# Patient Record
Sex: MALE | Race: ASIAN | NOT HISPANIC OR LATINO | Employment: UNEMPLOYED | URBAN - METROPOLITAN AREA
[De-identification: names, ages, dates, MRNs, and addresses within clinical notes are randomized per-mention and may not be internally consistent; named-entity substitution may affect disease eponyms.]

---

## 2017-07-06 ENCOUNTER — NON-PROVIDER VISIT (OUTPATIENT)
Dept: URGENT CARE | Facility: PHYSICIAN GROUP | Age: 37
End: 2017-07-06

## 2017-07-06 DIAGNOSIS — Z02.1 PRE-EMPLOYMENT DRUG SCREENING: ICD-10-CM

## 2017-07-06 LAB
AMP AMPHETAMINE: NORMAL
COC COCAINE: NORMAL
INT CON NEG: NEGATIVE
INT CON POS: POSITIVE
MET METHAMPHETAMINES: NORMAL
OPI OPIATES: NORMAL
PCP PHENCYCLIDINE: NORMAL
POC DRUG COMMENT 753798-OCCUPATIONAL HEALTH: NEGATIVE
THC: NORMAL

## 2017-07-06 PROCEDURE — 80305 DRUG TEST PRSMV DIR OPT OBS: CPT | Performed by: PHYSICIAN ASSISTANT

## 2017-08-07 ENCOUNTER — NON-PROVIDER VISIT (OUTPATIENT)
Dept: OCCUPATIONAL MEDICINE | Facility: CLINIC | Age: 37
End: 2017-08-07

## 2017-08-07 DIAGNOSIS — Z02.1 DRUG SCREENING, PRE-EMPLOYMENT: ICD-10-CM

## 2017-08-07 PROCEDURE — 8898 PR URINE 11 PANEL - SEND TO LAB: Performed by: PREVENTIVE MEDICINE

## 2017-08-07 NOTE — MR AVS SNAPSHOT
Marco Stephenosn   2017 6:00 PM   Appointment   MRN: [id number]    Department:  [facility]   Dept Phone:  [phone]    Description:  Male : 1980   Provider:  [place] NON [facility]           Allergies as of 2017     Allergen Noted Reactions    Pcn [Penicillins] 2013       Never has taken. Reported due to parents having allergy      Vital Signs     Smoking Status                   Never Smoker            Basic Information     Date Of Birth Sex Race Ethnicity Preferred Language    1980 Male Other Non- English      Problem List              ICD-10-CM Priority Class Noted - Resolved    Schizophrenia, schizo-affective (CMS-HCC) F25.0   2013 - Present    Acute kidney injury (CMS-[facility]) N17.9 High  2013 - Present    Gastroenteritis K52.9 High  2013 - Present    Nausea & vomiting R11.2   2013 - Present    Abdominal pain    2013 - Present    Diarrhea R19.7   2013 - Present    Leukocytosis D72.829   2013 - Present      Health Maintenance        Date Due Completion Dates    IMM DTaP/Tdap/Td Vaccine (1 - Tdap) 9/15/2001 ---    IMM INFLUENZA (1) 2017 ---            Current Immunizations     No immunizations on file.      Below and/or attached are the medications your provider expects you to take. Review all of your home medications and newly ordered medications with your provider and/or pharmacist. Follow medication instructions as directed by your provider and/or pharmacist. Please keep your medication list with you and share with your provider. Update the information when medications are discontinued, doses are changed, or new medications (including over-the-counter products) are added; and carry medication information at all times in the event of emergency situations     Allergies:  PCN - (reactions not documented)               Medications  Valid as of: 2017 - 12:04 PM    Generic Name Brand Name Tablet Size Instructions for use    Promethazine HCl (Syrup) PHENERGAN 6.25 MG/5ML Take 10 mL by mouth 4 times a day as needed for Nausea/Vomiting.        RisperiDONE (Tab) RISPERDAL 1 MG Take 3 mg by mouth every day. Nightly before bed. prescribed by [facility]  Indications: Schizophrenia        .                 Medicines prescribed today were sent to:     None      Medication refill instructions:       If your prescription bottle indicates you have medication refills left, it is not necessary to call your provider’s office. Please contact your pharmacy and they will refill your medication.    If your prescription bottle indicates you do not have any refills left, you may request refills at any time through one of the following ways: The online [employer] system (except Urgent Care), by calling your provider’s office, or by asking your pharmacy to contact your provider’s office with a refill request. Medication refills are processed only during regular business hours and may not be available until the next business day. Your provider may request additional information or to have a follow-up visit with you prior to refilling your medication.   *Please Note: Medication refills are assigned a new Rx number when refilled electronically. Your pharmacy may indicate that no refills were authorized even though a new prescription for the same medication is available at the pharmacy. Please request the medicine by name with the pharmacy before contacting your provider for a refill.           [employer] Access Code: [email or web address]  Expires: 9/7/2017 12:04 PM    Your email address is not on file at [facility]  Email Addresses are required for you to sign up for [employer], please contact [phone] to verify your personal information and to provide your email address prior to attempting to register for [employer].    Marco Stephenson  [address] [place] [address]    [employer]  A secure, online tool to manage your health information     [employer]s [employer]® is a  secure, online tool that connects you to your personalized health information from the privacy of your home -- day or night - making it very easy for you to manage your healthcare. Once the activation process is completed, you can even access your medical information using the [employer] nikolay, which is available for free in the Apple Nikolay store or Google Play store.     To learn more about [employer], visit [email or web address]    There are two levels of access available (as shown below):   My Chart Features  [facility]  Urgent  Care Non-[facility] Primary [facility] Doctor   Email your healthcare team securely and privately 24/7 X X X    Manage appointments: schedule your next appointment; view details of past/upcoming appointments X      Request prescription refills. X      View recent personal medical records, including lab and immunizations X X X X   View health record, including health history, allergies, medications X X X X   Read reports about your outpatient visits, procedures, consult and ER notes X X X X   See your discharge summary, which is a recap of your hospital and/or ER visit that includes your diagnosis, lab results, and care plan X X  X     How to register for [employer]:  Once your e-mail address has been verified, follow the following steps to sign up for [employer].     1. Go to  [email or web address]  2. Click on the Sign Up Now box, which takes you to the New Member Sign Up page. You will need to provide the following information:  a. Enter your [employer] Access Code exactly as it appears at the top of this page. (You will not need to use this code after you’ve completed the sign-up process. If you do not sign up before the expiration date, you must request a new code.)   b. Enter your date of birth.   c. Enter your home email address.   d. Click Submit, and follow the next screen’s instructions.  3. Create a [employer] ID. This will be your [employer] login ID and cannot be  changed, so think of one that is secure and easy to remember.  4. Create a [employer] password. You can change your password at any time.  5. Enter your Password Reset Question and Answer. This can be used at a later time if you forget your password.   6. Enter your e-mail address. This allows you to receive e-mail notifications when new information is available in [employer].  7. Click Sign Up. You can now view your health information.    For assistance activating your [employer] account, call [phone]

## 2019-11-25 ENCOUNTER — APPOINTMENT (OUTPATIENT)
Dept: RADIOLOGY | Facility: MEDICAL CENTER | Age: 39
End: 2019-11-25
Attending: EMERGENCY MEDICINE
Payer: MEDICAID

## 2019-11-25 ENCOUNTER — HOSPITAL ENCOUNTER (EMERGENCY)
Facility: MEDICAL CENTER | Age: 39
End: 2019-11-25
Attending: EMERGENCY MEDICINE
Payer: MEDICAID

## 2019-11-25 VITALS
HEART RATE: 115 BPM | HEIGHT: 69 IN | TEMPERATURE: 99.7 F | WEIGHT: 197.31 LBS | OXYGEN SATURATION: 92 % | SYSTOLIC BLOOD PRESSURE: 120 MMHG | DIASTOLIC BLOOD PRESSURE: 90 MMHG | RESPIRATION RATE: 16 BRPM | BODY MASS INDEX: 29.22 KG/M2

## 2019-11-25 DIAGNOSIS — J10.1 INFLUENZA B: ICD-10-CM

## 2019-11-25 LAB
FLUAV RNA SPEC QL NAA+PROBE: NEGATIVE
FLUBV RNA SPEC QL NAA+PROBE: POSITIVE
S PYO DNA SPEC NAA+PROBE: NOT DETECTED

## 2019-11-25 PROCEDURE — 71046 X-RAY EXAM CHEST 2 VIEWS: CPT

## 2019-11-25 PROCEDURE — 99284 EMERGENCY DEPT VISIT MOD MDM: CPT

## 2019-11-25 PROCEDURE — 87502 INFLUENZA DNA AMP PROBE: CPT

## 2019-11-25 PROCEDURE — 87651 STREP A DNA AMP PROBE: CPT

## 2019-11-25 PROCEDURE — 700102 HCHG RX REV CODE 250 W/ 637 OVERRIDE(OP): Performed by: EMERGENCY MEDICINE

## 2019-11-25 PROCEDURE — A9270 NON-COVERED ITEM OR SERVICE: HCPCS | Performed by: EMERGENCY MEDICINE

## 2019-11-25 RX ORDER — OSELTAMIVIR PHOSPHATE 75 MG/1
75 CAPSULE ORAL 2 TIMES DAILY
Qty: 10 CAP | Refills: 0 | Status: SHIPPED | OUTPATIENT
Start: 2019-11-25 | End: 2019-11-30

## 2019-11-25 RX ORDER — IBUPROFEN 600 MG/1
600 TABLET ORAL ONCE
Status: COMPLETED | OUTPATIENT
Start: 2019-11-25 | End: 2019-11-25

## 2019-11-25 RX ADMIN — IBUPROFEN 600 MG: 600 TABLET ORAL at 13:47

## 2019-11-25 ASSESSMENT — LIFESTYLE VARIABLES
HAVE PEOPLE ANNOYED YOU BY CRITICIZING YOUR DRINKING: NO
TOTAL SCORE: 0
TOTAL SCORE: 0
EVER FELT BAD OR GUILTY ABOUT YOUR DRINKING: NO
HAVE YOU EVER FELT YOU SHOULD CUT DOWN ON YOUR DRINKING: NO
DOES PATIENT WANT TO STOP DRINKING: NO
CONSUMPTION TOTAL: INCOMPLETE
EVER HAD A DRINK FIRST THING IN THE MORNING TO STEADY YOUR NERVES TO GET RID OF A HANGOVER: NO
TOTAL SCORE: 0
DO YOU DRINK ALCOHOL: NO

## 2019-11-25 NOTE — ED PROVIDER NOTES
ED Provider Note    Scribed for Brendan Schultz M.D. by Fernando Gomez. 11/25/2019  1:47 PM    Primary care provider: EWA Manuel  Means of arrival: Walk in  History obtained from: Patient  History limited by: None    CHIEF COMPLAINT  Chief Complaint   Patient presents with   • Sore Throat   • Flu Like Symptoms       HPI  Marco Stephenson is a 39 y.o. male who presents to the Emergency Department complaining of flu-like symptoms onset yesterday. Patient reports having associated fever and sore throat. He describes having post nasal drainage that builds up when he is laying down. He intermittently feels short of breath. No reports of any weakness. Patient states he only took Nyasia [place] earlier today but otherwise denies taking any other medications. He did not get the influenza vaccination for this season yet. Denies smoking.    REVIEW OF SYSTEMS  Pertinent positives include fever, sore throat, post nasal drainage, intermittent shortness of breath. Pertinent negatives include no weakness.      PAST MEDICAL HISTORY   has a past medical history of Fallen arches, Herpes, and Schizophrenia, schizo-affective (HCC).    SURGICAL HISTORY  patient denies any surgical history    SOCIAL HISTORY  Social History     Tobacco Use   • Smoking status: Never Smoker   • Smokeless tobacco: Never Used   Substance Use Topics   • Alcohol use: Not Currently     Comment: sober for 3-4 days   • Drug use: No      Social History     Substance and Sexual Activity   Drug Use No       FAMILY HISTORY  Family History   Problem Relation Age of Onset   • Lung Disease Neg Hx    • Cancer Neg Hx    • Diabetes Neg Hx    • Heart Disease Neg Hx    • Stroke Neg Hx        CURRENT MEDICATIONS  Home Medications     Reviewed by Thony Gonzales R.N. (Registered Nurse) on 11/25/19 at 1302  Med List Status: Partial   Medication Last Dose Status   GABAPENTIN PO  Active   LORazepam (ATIVAN PO)  Active   Paliperidone Palmitate (INVEGA TRINZA IM)  Active  "               ALLERGIES  Allergies   Allergen Reactions   • Pcn [Penicillins]      Never has taken. Reported due to parents having allergy       PHYSICAL EXAM  VITAL SIGNS: /88   Pulse (!) 112   Temp (!) 38.1 °C (100.5 °F)   Resp 16   Ht 1.753 m (5' 9\")   Wt 89.5 kg (197 lb 5 oz)   SpO2 92%   BMI 29.14 kg/m²   Constitutional: Well developed, Well nourished, No acute distress, Non-toxic appearance.   HENT: Normocephalic, Atraumatic.  Oropharynx moist. Post nasal drainage with uvular edema. Nasal congestion.  Eyes: PERRL, EOMI, Conjunctiva normal, No discharge.   CV: Good pulses  Thorax & Lungs: No respiratory distress. Clear to auscultation.  Skin: Warm, Dry, No erythema, No rash.    Musculoskeletal: No major deformities noted.   Neurologic: Awake, alert. Moves all extremities spontaneously.  Psychiatric: Affect normal, Mood normal.    LABS  Results for orders placed or performed during the hospital encounter of 11/25/19   Group A Strep by PCR   Result Value Ref Range    Group A Strep by PCR Not Detected Not Detected   Influenza A/B By PCR (Adult - Flu Only)   Result Value Ref Range    Influenza virus A RNA Negative Negative    Influenza virus B, PCR POSITIVE (A) Negative      All labs reviewed by me.     RADIOLOGY  DX-CHEST-2 VIEWS   Final Result      No acute cardiopulmonary findings.        The radiologist's interpretation of all radiological studies have been reviewed by me.    COURSE & MEDICAL DECISION MAKING  Nursing notes, VS, PMSFHx reviewed in chart.    1:47 PM - Patient seen and examined at bedside. Discussed plan of care which includes strep test, influenza test, and chest xray. He understands and agrees to plan. Patient will be treated with motrin 600 mg. Ordered DX chest, group A strep by PCR, influenza a/b by pcr to evaluate his symptoms.     3:06 PM Patient reevaluated at bedside. Informed patient that he had a positive influenza test as seen above. Chest xray was overall unrevealing " without evidence of pneumonia. Antibiotics are not indicated at this point. Informed patient we can prescribe tamiflu.  Informed patient that tamiflu only shortens the illness by about 12 hours and may reduce severity of his symptoms. Reviewed the risks and benefits as well as potential side effects of tamiflu including nausea, vomiting, and diarrhea. He would like to be treated with Tamiflu. The patient will be discharged with instructions regarding supportive care and medications. Prescription for tamiflu was provided. Recommended taking Tylenol and motrin as needed. Instructions were given for follow-up. Discussed indications for seeking immediate medical attention. Patient was given the opportunity for questions. The patient understands and agrees.      Decision Making:  Patient with URI type symptoms, sore throat, rapid strep was negative, influenza B positive.  Will prescribe the patient Tamiflu, give the patient ibuprofen, upon recheck the patient is feeling improved.  The patient's heart rate is still elevated but I believe that is likely secondary to the flulike symptoms.  Patient will be discharged home, return with worsening symptoms per     The patient will return for new or worsening symptoms and is stable at the time of discharge.    The patient is referred to a primary physician for blood pressure management, diabetic screening, and for all other preventative health concerns.    DISPOSITION:  Patient will be discharged home in stable condition.    FOLLOW UP:  [facility], Emergency Dept  [address] [address] [phone]    If symptoms worsen      OUTPATIENT MEDICATIONS:  Discharge Medication List as of 11/25/2019  3:11 PM      START taking these medications    Details   oseltamivir (TAMIFLU) 75 MG Cap Take 1 Cap by mouth 2 times a day for 5 days., Disp-10 Cap, R-0, Print Rx Paper               FINAL IMPRESSION  1. Influenza B          I, Fernando Gomez  (Scribe), am scribing for, and in the presence of, Brendan Schultz M.D..    Electronically signed by: Fernando Gomez (Scribe), 11/25/2019    I, Brendan Schultz M.D. personally performed the services described in this documentation, as scribed by Fernando Gomez in my presence, and it is both accurate and complete. E    The note accurately reflects work and decisions made by me.  Brendan Schultz  11/25/2019  6:07 PM

## 2019-11-25 NOTE — ED TRIAGE NOTES
38 y/o male ambulatory to triage with c/o flu like symptoms and sore throat. Pt states his symptoms began yesterday. Reports taking Nysaia seltzer this morning.

## 2020-07-31 ENCOUNTER — HOSPITAL ENCOUNTER (EMERGENCY)
Facility: MEDICAL CENTER | Age: 40
End: 2020-07-31
Attending: EMERGENCY MEDICINE
Payer: MEDICAID

## 2020-07-31 VITALS
DIASTOLIC BLOOD PRESSURE: 98 MMHG | TEMPERATURE: 97.6 F | RESPIRATION RATE: 16 BRPM | WEIGHT: 188.27 LBS | HEART RATE: 83 BPM | OXYGEN SATURATION: 96 % | BODY MASS INDEX: 27.89 KG/M2 | HEIGHT: 69 IN | SYSTOLIC BLOOD PRESSURE: 135 MMHG

## 2020-07-31 DIAGNOSIS — F10.10 ALCOHOL ABUSE: ICD-10-CM

## 2020-07-31 DIAGNOSIS — F10.982 ALCOHOL-INDUCED INSOMNIA (HCC): ICD-10-CM

## 2020-07-31 LAB
ALBUMIN SERPL BCP-MCNC: 4.2 G/DL (ref 3.2–4.9)
ALBUMIN/GLOB SERPL: 1.2 G/DL
ALP SERPL-CCNC: 84 U/L (ref 30–99)
ALT SERPL-CCNC: 29 U/L (ref 2–50)
ANION GAP SERPL CALC-SCNC: 14 MMOL/L (ref 7–16)
AST SERPL-CCNC: 33 U/L (ref 12–45)
BASOPHILS # BLD AUTO: 0.2 % (ref 0–1.8)
BASOPHILS # BLD: 0.02 K/UL (ref 0–0.12)
BILIRUB SERPL-MCNC: 1.1 MG/DL (ref 0.1–1.5)
BUN SERPL-MCNC: 10 MG/DL (ref 8–22)
CALCIUM SERPL-MCNC: 9.8 MG/DL (ref 8.5–10.5)
CHLORIDE SERPL-SCNC: 99 MMOL/L (ref 96–112)
CO2 SERPL-SCNC: 25 MMOL/L (ref 20–33)
CREAT SERPL-MCNC: 0.88 MG/DL (ref 0.5–1.4)
EOSINOPHIL # BLD AUTO: 0.07 K/UL (ref 0–0.51)
EOSINOPHIL NFR BLD: 0.8 % (ref 0–6.9)
ERYTHROCYTE [DISTWIDTH] IN BLOOD BY AUTOMATED COUNT: 47.5 FL (ref 35.9–50)
ETHANOL BLD-MCNC: <10.1 MG/DL (ref 0–10.1)
GLOBULIN SER CALC-MCNC: 3.5 G/DL (ref 1.9–3.5)
GLUCOSE SERPL-MCNC: 86 MG/DL (ref 65–99)
HAV IGM SERPL QL IA: NORMAL
HBV CORE IGM SER QL: NORMAL
HBV SURFACE AG SER QL: NORMAL
HCT VFR BLD AUTO: 54.8 % (ref 42–52)
HCV AB SER QL: NORMAL
HGB BLD-MCNC: 17.8 G/DL (ref 14–18)
IMM GRANULOCYTES # BLD AUTO: 0.04 K/UL (ref 0–0.11)
IMM GRANULOCYTES NFR BLD AUTO: 0.4 % (ref 0–0.9)
LIPASE SERPL-CCNC: 32 U/L (ref 11–82)
LYMPHOCYTES # BLD AUTO: 1.27 K/UL (ref 1–4.8)
LYMPHOCYTES NFR BLD: 14 % (ref 22–41)
MCH RBC QN AUTO: 30.5 PG (ref 27–33)
MCHC RBC AUTO-ENTMCNC: 32.5 G/DL (ref 33.7–35.3)
MCV RBC AUTO: 94 FL (ref 81.4–97.8)
MONOCYTES # BLD AUTO: 0.61 K/UL (ref 0–0.85)
MONOCYTES NFR BLD AUTO: 6.7 % (ref 0–13.4)
NEUTROPHILS # BLD AUTO: 7.09 K/UL (ref 1.82–7.42)
NEUTROPHILS NFR BLD: 77.9 % (ref 44–72)
NRBC # BLD AUTO: 0 K/UL
NRBC BLD-RTO: 0 /100 WBC
PLATELET # BLD AUTO: 182 K/UL (ref 164–446)
PMV BLD AUTO: 10.3 FL (ref 9–12.9)
POTASSIUM SERPL-SCNC: 4.1 MMOL/L (ref 3.6–5.5)
PROT SERPL-MCNC: 7.7 G/DL (ref 6–8.2)
RBC # BLD AUTO: 5.83 M/UL (ref 4.7–6.1)
SODIUM SERPL-SCNC: 138 MMOL/L (ref 135–145)
WBC # BLD AUTO: 9.1 K/UL (ref 4.8–10.8)

## 2020-07-31 PROCEDURE — 99284 EMERGENCY DEPT VISIT MOD MDM: CPT

## 2020-07-31 PROCEDURE — 80053 COMPREHEN METABOLIC PANEL: CPT

## 2020-07-31 PROCEDURE — 85025 COMPLETE CBC W/AUTO DIFF WBC: CPT

## 2020-07-31 PROCEDURE — 83690 ASSAY OF LIPASE: CPT

## 2020-07-31 PROCEDURE — 80307 DRUG TEST PRSMV CHEM ANLYZR: CPT

## 2020-07-31 PROCEDURE — 80074 ACUTE HEPATITIS PANEL: CPT

## 2020-07-31 RX ORDER — HYDROXYZINE 50 MG/1
50 TABLET, FILM COATED ORAL NIGHTLY PRN
Qty: 10 TAB | Refills: 0 | Status: SHIPPED | OUTPATIENT
Start: 2020-07-31

## 2020-07-31 SDOH — HEALTH STABILITY: MENTAL HEALTH: HOW MANY STANDARD DRINKS CONTAINING ALCOHOL DO YOU HAVE ON A TYPICAL DAY?: 5 OR 6

## 2020-07-31 SDOH — HEALTH STABILITY: MENTAL HEALTH: HOW OFTEN DO YOU HAVE A DRINK CONTAINING ALCOHOL?: 4 OR MORE TIMES A WEEK

## 2020-07-31 NOTE — ED TRIAGE NOTES
"..  Chief Complaint   Patient presents with   • ETOH Withdrawal     c/o of drinking a half bottle of vodka every day for 7 years and admits to having withdrawls when he doesn't drink and would like to stop   • Fall     reports falling last night and today his eyes \"feel\" heavy. unknown LOC. pt's mom states he may have fell on his R orbital area and has small scratch    ../86   Pulse (!) 106   Temp 36.3 °C (97.3 °F) (Temporal)   Resp 16   Ht 1.753 m (5' 9\")   Wt 85.4 kg (188 lb 4.4 oz)   SpO2 92%      .Explained triage process, to waiting room. Asked to inform RN if questions or concerns arise.   "

## 2020-08-01 NOTE — ED PROVIDER NOTES
"ED Provider Note    CHIEF COMPLAINT  Chief Complaint   Patient presents with   • ETOH Withdrawal     c/o of drinking a half bottle of vodka every day for 7 years and admits to having withdrawls when he doesn't drink and would like to stop   • Fall     reports falling last night and today his eyes \"feel\" heavy. unknown LOC. pt's mom states he may have fell on his R orbital area and has small scratch        HPI  Marco Stephenson is a 39 y.o. male who presents stating he is concerned he is having hepatitis.  Patient is a alcoholic, quit drinking 5 days ago, was without alcohol for 4 days and again drank yesterday.  He tripped and fell, states he bumped his cheek on the ground.  He denies headache or facial pain at this time.  No neck pain.  Patient states his last drink alcohol was yesterday.  No known seizure.  No tongue bite or incontinence.  He denies neck or spinal pain.  Patient states he would be interested in referral to detox center.    REVIEW OF SYSTEMS  Constitutional: Feels tired.  No fever  Respiratory: No shortness of breath  Cardiac: No chest pain or syncope  Gastrointestinal: No abdominal pain, no vomiting.  Patient concerned he might have hepatitis  Musculoskeletal: No acute neck or back pain  ENT: Cheek discomfort from the fall is now resolved.  No malocclusion  Neurologic: No headache, no numbness or weakness focally  Genitourinary: Denies urinary difficulty  Psychiatric: No suicidal ideation.  History of alcoholism.  History of schizophrenia       All other systems are negative.     PAST MEDICAL HISTORY  Past Medical History:   Diagnosis Date   • Fallen arches     age 16. insoles   • Herpes     dx [facility] 2009. no treatment   • Schizophrenia, schizo-affective (HCC)        FAMILY HISTORY  Family History   Problem Relation Age of Onset   • Lung Disease Neg Hx    • Cancer Neg Hx    • Diabetes Neg Hx    • Heart Disease Neg Hx    • Stroke Neg Hx        SOCIAL HISTORY  Social History     Socioeconomic History "   • Marital status: Single     Spouse name: Not on file   • Number of children: Not on file   • Years of education: Not on file   • Highest education level: Not on file   Occupational History   • Not on file   Social Needs   • Financial resource strain: Not on file   • Food insecurity     Worry: Not on file     Inability: Not on file   • Transportation needs     Medical: Not on file     Non-medical: Not on file   Tobacco Use   • Smoking status: Never Smoker   • Smokeless tobacco: Never Used   Substance and Sexual Activity   • Alcohol use: Yes     Frequency: 4 or more times a week     Drinks per session: 5 or 6   • Drug use: Yes     Types: Inhaled     Comment: THC occationally    • Sexual activity: Not Currently     Comment: last 2009   Lifestyle   • Physical activity     Days per week: Not on file     Minutes per session: Not on file   • Stress: Not on file   Relationships   • Social connections     Talks on phone: Not on file     Gets together: Not on file     Attends Restorationism service: Not on file     Active member of club or organization: Not on file     Attends meetings of clubs or organizations: Not on file     Relationship status: Not on file   • Intimate partner violence     Fear of current or ex partner: Not on file     Emotionally abused: Not on file     Physically abused: Not on file     Forced sexual activity: Not on file   Other Topics Concern   • Not on file   Social History Narrative   • Not on file       SURGICAL HISTORY  History reviewed. No pertinent surgical history.    CURRENT MEDICATIONS  Home Medications     Reviewed by Dannielle Eaton R.N. (Registered Nurse) on 07/31/20 at 1439  Med List Status: Not Addressed   Medication Last Dose Status   GABAPENTIN PO  Active   LORazepam (ATIVAN PO)  Active   Paliperidone Palmitate (INVEGA TRINZA IM)  Active                ALLERGIES  Allergies   Allergen Reactions   • Pcn [Penicillins]      Never has taken. Reported due to parents having allergy  "      PHYSICAL EXAM  VITAL SIGNS: /95   Pulse 99   Temp 36.5 °C (97.7 °F) (Temporal)   Resp 16   Ht 1.753 m (5' 9\")   Wt 85.4 kg (188 lb 4.4 oz)   SpO2 95%   BMI 27.80 kg/m²   Constitutional: Well-nourished, no distress  ENT: Nares clear, mucous membranes moist.  Eyes: No scleral icterus, No discharge.  Pupils are equal.  No nystagmus  Lymphatic: No adenopathy.   Cardiovascular: Normal heart rate, Normal rhythm.   Pulmonary: No wheezing, no rales  Gastrointestinal: Abdomen is soft, nontender, no guarding.  No hepatomegaly  Skin: Warm, Dry, No jaundice.   Musculoskeletal:  No CVA tenderness.   Neurologic:  Normal motor and sensory function, No focal deficits noted.   Psychiatric:Normal affect, Normal mood.    RADIOLOGY/PROCEDURES/Labs  Results for orders placed or performed during the hospital encounter of 07/31/20   LIPASE   Result Value Ref Range    Lipase 32 11 - 82 U/L   HEPATITIS PANEL ACUTE(4 COMPONENTS)   Result Value Ref Range    Hepatitis B Surface Antigen Non-Reactive Non-Reactive    Hepatitis B Cors Ab,IgM Non-Reactive Non-Reactive    Hepatitis A Virus Ab, IgM Non-Reactive Non-Reactive    Hepatitis C Antibody Non-Reactive Non-Reactive   DIAGNOSTIC ALCOHOL   Result Value Ref Range    Diagnostic Alcohol <10.1 0.0 - 10.1 mg/dL   CBC WITH DIFFERENTIAL   Result Value Ref Range    WBC 9.1 4.8 - 10.8 K/uL    RBC 5.83 4.70 - 6.10 M/uL    Hemoglobin 17.8 14.0 - 18.0 g/dL    Hematocrit 54.8 (H) 42.0 - 52.0 %    MCV 94.0 81.4 - 97.8 fL    MCH 30.5 27.0 - 33.0 pg    MCHC 32.5 (L) 33.7 - 35.3 g/dL    RDW 47.5 35.9 - 50.0 fL    Platelet Count 182 164 - 446 K/uL    MPV 10.3 9.0 - 12.9 fL    Neutrophils-Polys 77.90 (H) 44.00 - 72.00 %    Lymphocytes 14.00 (L) 22.00 - 41.00 %    Monocytes 6.70 0.00 - 13.40 %    Eosinophils 0.80 0.00 - 6.90 %    Basophils 0.20 0.00 - 1.80 %    Immature Granulocytes 0.40 0.00 - 0.90 %    Nucleated RBC 0.00 /100 WBC    Neutrophils (Absolute) 7.09 1.82 - 7.42 K/uL    Lymphs " (Absolute) 1.27 1.00 - 4.80 K/uL    Monos (Absolute) 0.61 0.00 - 0.85 K/uL    Eos (Absolute) 0.07 0.00 - 0.51 K/uL    Baso (Absolute) 0.02 0.00 - 0.12 K/uL    Immature Granulocytes (abs) 0.04 0.00 - 0.11 K/uL    NRBC (Absolute) 0.00 K/uL   Comp Metabolic Panel   Result Value Ref Range    Sodium 138 135 - 145 mmol/L    Potassium 4.1 3.6 - 5.5 mmol/L    Chloride 99 96 - 112 mmol/L    Co2 25 20 - 33 mmol/L    Anion Gap 14.0 7.0 - 16.0    Glucose 86 65 - 99 mg/dL    Bun 10 8 - 22 mg/dL    Creatinine 0.88 0.50 - 1.40 mg/dL    Calcium 9.8 8.5 - 10.5 mg/dL    AST(SGOT) 33 12 - 45 U/L    ALT(SGPT) 29 2 - 50 U/L    Alkaline Phosphatase 84 30 - 99 U/L    Total Bilirubin 1.1 0.1 - 1.5 mg/dL    Albumin 4.2 3.2 - 4.9 g/dL    Total Protein 7.7 6.0 - 8.2 g/dL    Globulin 3.5 1.9 - 3.5 g/dL    A-G Ratio 1.2 g/dL   ESTIMATED GFR   Result Value Ref Range    GFR If African American >60 >60 mL/min/1.73 m 2    GFR If Non African American >60 >60 mL/min/1.73 m 2         COURSE & MEDICAL DECISION MAKING  Pertinent Labs & Imaging studies reviewed. (See chart for details)  Patient laboratory evaluation unremarkable, no evidence of acute hepatitis or liver dysfunction by his labs.  His white blood cell count and red blood cell count also normal.  Patient is currently calm, no tremulousness, vital signs show slight elevation of blood pressure and pulse.  He does not however appear to be in overt withdrawal.  Patient was sober for 4 days before relapsing 1 day, it is unlikely he had enough to put him back into florid alcohol withdrawal.  He is interested in going to outpatient detox center, have requested [facility] see the patient regarding this or if available .  He will be discharged to care of his family, has requested something to help him sleep at night.  He states the past 2 nights has had difficulty sleeping.  He is prescribed Atarax 50 mg at bedtime if needed.  Patient well-appearing upon discharge    FINAL  IMPRESSION  1. Alcohol abuse     2. Alcohol-induced insomnia (HCC)             Electronically signed by: Tobias Tanner M.D., 7/31/2020 6:26 PM

## 2021-08-19 ENCOUNTER — HOSPITAL ENCOUNTER (EMERGENCY)
Facility: MEDICAL CENTER | Age: 41
End: 2021-08-19
Attending: EMERGENCY MEDICINE
Payer: MEDICAID

## 2021-08-19 VITALS
OXYGEN SATURATION: 92 % | BODY MASS INDEX: 23.87 KG/M2 | TEMPERATURE: 97.8 F | WEIGHT: 161.16 LBS | SYSTOLIC BLOOD PRESSURE: 109 MMHG | DIASTOLIC BLOOD PRESSURE: 77 MMHG | RESPIRATION RATE: 17 BRPM | HEIGHT: 69 IN | HEART RATE: 97 BPM

## 2021-08-19 DIAGNOSIS — Z20.822 SUSPECTED COVID-19 VIRUS INFECTION: ICD-10-CM

## 2021-08-19 PROCEDURE — U0005 INFEC AGEN DETEC AMPLI PROBE: HCPCS

## 2021-08-19 PROCEDURE — 99283 EMERGENCY DEPT VISIT LOW MDM: CPT

## 2021-08-19 PROCEDURE — U0003 INFECTIOUS AGENT DETECTION BY NUCLEIC ACID (DNA OR RNA); SEVERE ACUTE RESPIRATORY SYNDROME CORONAVIRUS 2 (SARS-COV-2) (CORONAVIRUS DISEASE [COVID-19]), AMPLIFIED PROBE TECHNIQUE, MAKING USE OF HIGH THROUGHPUT TECHNOLOGIES AS DESCRIBED BY CMS-2020-01-R: HCPCS

## 2021-08-19 ASSESSMENT — FIBROSIS 4 INDEX: FIB4 SCORE: 1.28

## 2021-08-19 NOTE — ED TRIAGE NOTES
"Chief Complaint   Patient presents with   • ETOH Withdrawal     pt reports last drink 11 days ago, reports that since he has last appetitie.     Pt reports that he lives with his mother and is currently COVID +, pt has been coughing.   BP (!) 99/65   Pulse (!) 102   Temp 36.6 °C (97.8 °F) (Temporal)   Resp 20   Ht 1.753 m (5' 9\")   Wt 73.1 kg (161 lb 2.5 oz)   SpO2 94%   Pt informed of wait times. Educated on triage process.  Asked to return to triage RN for any new or worsening of symptoms. Thanked for patience.        "

## 2021-08-20 LAB
SARS-COV-2 RNA RESP QL NAA+PROBE: DETECTED
SPECIMEN SOURCE: ABNORMAL

## 2021-08-20 NOTE — ED PROVIDER NOTES
ED Provider Note    Scribed for Tobias Elkins M.D. by Kareem Dunbar. 8/19/2021  6:47 PM    Primary care provider: Pcp Pt States None  Means of arrival: walk in  History obtained from: Patient  History limited by: None    CHIEF COMPLAINT  Chief Complaint   Patient presents with    ETOH Withdrawal     pt reports last drink 11 days ago, reports that since he has last appetitie.       HPI  Marco Stephenson is a 40 y.o. male who presents to the Emergency Department for EtOH withdrawal onset 11 days ago. The patient states he last drank 11 days ago and has since lost his appetite. He reportedly last ate 2 days ago. He denies any tremors or headache. He also requests COVID testing since he has been in contact with his mother, who recently tested positive. He reports associated fatigue. No fever, cough, or chills.    REVIEW OF SYSTEMS  Pertinent positives include alcohol withdrawal, loss of appetite, fatigue. Pertinent negatives include no tremors, headache, fever, cough, or chills.   See HPI for further details.     PAST MEDICAL HISTORY   has a past medical history of ETOH abuse, Fallen arches, Herpes, and Schizophrenia, schizo-affective (HCC).    SURGICAL HISTORY  patient denies any surgical history    SOCIAL HISTORY  Social History     Tobacco Use    Smoking status: Never Smoker    Smokeless tobacco: Never Used   Vaping Use    Vaping Use: Never used   Substance Use Topics    Alcohol use: Yes    Drug use: Yes     Types: Inhaled     Comment: THC occationally       Social History     Substance and Sexual Activity   Drug Use Yes    Types: Inhaled    Comment: THC occationally        FAMILY HISTORY  Family History   Problem Relation Age of Onset    Lung Disease Neg Hx     Cancer Neg Hx     Diabetes Neg Hx     Heart Disease Neg Hx     Stroke Neg Hx        CURRENT MEDICATIONS  Home Medications    **Home medications have not yet been reviewed for this encounter**         ALLERGIES  Allergies   Allergen Reactions    Pcn  "[Penicillins]      Never has taken. Reported due to parents having allergy       PHYSICAL EXAM  VITAL SIGNS: BP (!) 99/65   Pulse (!) 102   Temp 36.6 °C (97.8 °F) (Temporal)   Resp 20   Ht 1.753 m (5' 9\")   Wt 73.1 kg (161 lb 2.5 oz)   SpO2 94%   BMI 23.80 kg/m²     Constitutional: Well developed, Well nourished,no acute distress, Non-toxic appearance.   HENT: Normocephalic, Atraumatic.  Oropharynx moist.   Eyes: PERRL, EOMI, Conjunctiva normal, No discharge.   Neck: no anterior cervical lymphadenopathy  CV: Good pulses  Thorax & Lungs: No respiratory distress.   Abdomen:  Soft, non-tender.  No rebound, no peritoneal signs.   Skin: Warm, Dry, No erythema, No rash.    Musculoskeletal: No major deformities noted.   Neurologic: Awake, alert. Moves all extremities spontaneously.  Psychiatric: Affect normal, Mood normal.     LABS  Results for orders placed or performed during the hospital encounter of 07/31/20   LIPASE   Result Value Ref Range    Lipase 32 11 - 82 U/L   HEPATITIS PANEL ACUTE(4 COMPONENTS)   Result Value Ref Range    Hepatitis B Surface Antigen Non-Reactive Non-Reactive    Hepatitis B Cors Ab,IgM Non-Reactive Non-Reactive    Hepatitis A Virus Ab, IgM Non-Reactive Non-Reactive    Hepatitis C Antibody Non-Reactive Non-Reactive   DIAGNOSTIC ALCOHOL   Result Value Ref Range    Diagnostic Alcohol <10.1 0.0 - 10.1 mg/dL   CBC WITH DIFFERENTIAL   Result Value Ref Range    WBC 9.1 4.8 - 10.8 K/uL    RBC 5.83 4.70 - 6.10 M/uL    Hemoglobin 17.8 14.0 - 18.0 g/dL    Hematocrit 54.8 (H) 42.0 - 52.0 %    MCV 94.0 81.4 - 97.8 fL    MCH 30.5 27.0 - 33.0 pg    MCHC 32.5 (L) 33.7 - 35.3 g/dL    RDW 47.5 35.9 - 50.0 fL    Platelet Count 182 164 - 446 K/uL    MPV 10.3 9.0 - 12.9 fL    Neutrophils-Polys 77.90 (H) 44.00 - 72.00 %    Lymphocytes 14.00 (L) 22.00 - 41.00 %    Monocytes 6.70 0.00 - 13.40 %    Eosinophils 0.80 0.00 - 6.90 %    Basophils 0.20 0.00 - 1.80 %    Immature Granulocytes 0.40 0.00 - 0.90 %    " Nucleated RBC 0.00 /100 WBC    Neutrophils (Absolute) 7.09 1.82 - 7.42 K/uL    Lymphs (Absolute) 1.27 1.00 - 4.80 K/uL    Monos (Absolute) 0.61 0.00 - 0.85 K/uL    Eos (Absolute) 0.07 0.00 - 0.51 K/uL    Baso (Absolute) 0.02 0.00 - 0.12 K/uL    Immature Granulocytes (abs) 0.04 0.00 - 0.11 K/uL    NRBC (Absolute) 0.00 K/uL   Comp Metabolic Panel   Result Value Ref Range    Sodium 138 135 - 145 mmol/L    Potassium 4.1 3.6 - 5.5 mmol/L    Chloride 99 96 - 112 mmol/L    Co2 25 20 - 33 mmol/L    Anion Gap 14.0 7.0 - 16.0    Glucose 86 65 - 99 mg/dL    Bun 10 8 - 22 mg/dL    Creatinine 0.88 0.50 - 1.40 mg/dL    Calcium 9.8 8.5 - 10.5 mg/dL    AST(SGOT) 33 12 - 45 U/L    ALT(SGPT) 29 2 - 50 U/L    Alkaline Phosphatase 84 30 - 99 U/L    Total Bilirubin 1.1 0.1 - 1.5 mg/dL    Albumin 4.2 3.2 - 4.9 g/dL    Total Protein 7.7 6.0 - 8.2 g/dL    Globulin 3.5 1.9 - 3.5 g/dL    A-G Ratio 1.2 g/dL   ESTIMATED GFR   Result Value Ref Range    GFR If African American >60 >60 mL/min/1.73 m 2    GFR If Non African American >60 >60 mL/min/1.73 m 2       All labs reviewed by me.    RADIOLOGY  No orders to display     The radiologist's interpretation of all radiological studies have been reviewed by me.    COURSE & MEDICAL DECISION MAKING  Nursing notes, VS, PMSFHx reviewed in chart.    6:47 PM - Patient seen and examined at bedside. Ordered SARS CoV-2 PCR to evaluate his symptoms.     The patient will return for new or worsening symptoms and is stable at the time of discharge.    The patient is referred to a primary physician for blood pressure management, diabetic screening, and for all other preventative health concerns.      DISPOSITION:  Patient will be discharged home in stable condition.    FOLLOW UP:  No follow-up provider specified.    OUTPATIENT MEDICATIONS:  New Prescriptions    No medications on file         FINAL IMPRESSION  1. Suspected COVID-19 virus infection          I, Kareem Dunbar (Mariah), am scribing for, and in the  presence of, Tobias Elkins M.D..    Electronically signed by: Kareem Dunbar (Scribe), 8/19/2021    I, Tobias lEkins M.D. personally performed the services described in this documentation, as scribed by Kareem Dunbar in my presence, and it is both accurate and complete. E    The note accurately reflects work and decisions made by me.  Tobias Elkins M.D.  8/19/2021  7:52 PM

## 2021-08-20 NOTE — ED NOTES
Pt ambulated from triage to room without difficulty. Pt sitting up in bed, call light in reach. Chart up for ERP. Pt requesting COVID testing. Pt reports his mother was recently dx and he has been in contact with her. Pt also reports decreased appetite since stopping ETOH use about 11 days ago.

## 2025-03-14 ENCOUNTER — OFFICE VISIT (OUTPATIENT)
Dept: MEDICAL GROUP | Facility: CLINIC | Age: 45
End: 2025-03-14
Payer: MEDICAID

## 2025-03-14 VITALS
BODY MASS INDEX: 30.21 KG/M2 | WEIGHT: 204 LBS | TEMPERATURE: 96.7 F | HEIGHT: 69 IN | HEART RATE: 75 BPM | DIASTOLIC BLOOD PRESSURE: 84 MMHG | OXYGEN SATURATION: 96 % | SYSTOLIC BLOOD PRESSURE: 116 MMHG

## 2025-03-14 DIAGNOSIS — M76.60 ACHILLES TENDON PAIN: ICD-10-CM

## 2025-03-14 PROCEDURE — 3079F DIAST BP 80-89 MM HG: CPT | Mod: GE | Performed by: BEHAVIOR ANALYST

## 2025-03-14 PROCEDURE — 99213 OFFICE O/P EST LOW 20 MIN: CPT | Mod: GE | Performed by: BEHAVIOR ANALYST

## 2025-03-14 PROCEDURE — 3074F SYST BP LT 130 MM HG: CPT | Mod: GE | Performed by: BEHAVIOR ANALYST

## 2025-03-14 NOTE — PROGRESS NOTES
"Subjective:     CC: achilles pain    HPI:   Marco is a 44 y.o. male who presents today for below concern.     Assessment:  Problem   Achilles Tendon Pain    -b/l achillese pain and posterior ankle pain  -going on for years  -works in a warehouse, does squats sometimes for work out  -no inciting trauma that is recalled  -no fever chills or other msk pain  -pt stretches achilles and gets some relief    Plan:  -otc ibuprofen prn  -ref to PT  -f/u in 2-3mo or sooner prn             All plans of care were fully discussed with the patient and shared-decision making was utilized to conclude best course of actions in patient's medical management.    ROS: See HPI.     Objective:     Exam:  /84 (BP Location: Left arm, Patient Position: Sitting, BP Cuff Size: Adult)   Pulse 75   Temp 35.9 °C (96.7 °F) (Temporal)   Ht 1.753 m (5' 9\")   Wt 92.5 kg (204 lb)   SpO2 96%   BMI 30.13 kg/m²  Body mass index is 30.13 kg/m².    Physical Exam:  General: Pt resting in NAD, cooperative   Skin:  No cyanosis or jaundice   HEENT: NC/AT. EOMI. No conjunctival injection or sclera icterus.   Extremities:  No LE edema; no calf or achilles ttp b/l, somewhat flat feet b/l but otherwise no gross deformity  CNS:  No gross focal neurologic deficits  Psych: Appropriate mood and affect         Assessment & Plan:     See A/P under Subjective Section above    Problem List Items Addressed This Visit       Achilles tendon pain    Relevant Orders    Referral to Physical Therapy               "